# Patient Record
Sex: FEMALE | Race: WHITE | Employment: FULL TIME | URBAN - METROPOLITAN AREA
[De-identification: names, ages, dates, MRNs, and addresses within clinical notes are randomized per-mention and may not be internally consistent; named-entity substitution may affect disease eponyms.]

---

## 2024-02-23 ENCOUNTER — HOSPITAL ENCOUNTER (OUTPATIENT)
Age: 54
Discharge: HOME | End: 2024-02-23
Payer: COMMERCIAL

## 2024-02-23 VITALS
BODY MASS INDEX: 34.15 KG/M2 | HEART RATE: 81 BPM | TEMPERATURE: 98 F | DIASTOLIC BLOOD PRESSURE: 88 MMHG | WEIGHT: 200 LBS | RESPIRATION RATE: 18 BRPM | OXYGEN SATURATION: 97 % | HEIGHT: 64 IN | SYSTOLIC BLOOD PRESSURE: 134 MMHG

## 2024-02-23 DIAGNOSIS — N30.00 ACUTE CYSTITIS WITHOUT HEMATURIA: Primary | ICD-10-CM

## 2024-02-23 LAB
CLARITY UR: CLEAR
GLUCOSE UR STRIP-MCNC: 100 MG/DL
NITRITE UR QL STRIP: POSITIVE
PH UR STRIP: 6.5 [PH]
PROT UR STRIP-MCNC: 100 MG/DL
SP GR UR STRIP: 1.01
UROBILINOGEN UR STRIP-ACNC: 4 MG/DL

## 2024-02-23 PROCEDURE — 99203 OFFICE O/P NEW LOW 30 MIN: CPT

## 2024-02-23 PROCEDURE — 81002 URINALYSIS NONAUTO W/O SCOPE: CPT

## 2024-02-23 PROCEDURE — 99204 OFFICE O/P NEW MOD 45 MIN: CPT

## 2024-02-23 RX ORDER — SULFAMETHOXAZOLE AND TRIMETHOPRIM 800; 160 MG/1; MG/1
1 TABLET ORAL 2 TIMES DAILY
Qty: 14 TABLET | Refills: 0 | Status: SHIPPED | OUTPATIENT
Start: 2024-02-23 | End: 2024-03-01

## 2024-02-23 NOTE — ED INITIAL ASSESSMENT (HPI)
Pt began 2-3 days ago with urinary pain , frequency, and urgency and then had some back pain today

## 2024-02-24 NOTE — ED PROVIDER NOTES
Patient Seen in: [facility]      History     Chief Complaint   Patient presents with    Urinary Symptoms     Stated Complaint: urinary issue, fever    Subjective:   HPI    Dolly is a 53-year-old female presents today for evaluation of dysuria.  She states that a couple of days ago she started to have urinary pain and frequency.  She then developed urgency and back pain today, worse on the right than the left.  She started taking Azo, but has not had much relief.  She denies gross hematuria, fever, chills, nausea and vomiting.    Objective:   Past Medical History:   Diagnosis Date    Amenorrhea     Diverticulitis     Hyperlipidemia     Pap smear for cervical cancer screening 11/05,6/08,11/11,4/30/13    wnl    Thyroid disease               Past Surgical History:   Procedure Laterality Date    ABLATION  2002    for heart accessory pathway    ABLATION      CARDIAC                Social History     Socioeconomic History    Marital status:    Occupational History    Occupation:    Tobacco Use    Smoking status: Never    Smokeless tobacco: Never   Vaping Use    Vaping Use: Never used   Substance and Sexual Activity    Alcohol use: Yes     Comment: occ    Drug use: No              Review of Systems    Positive for stated complaint: urinary issue, fever  Other systems are as noted in HPI.  Constitutional and vital signs reviewed.      All other systems reviewed and negative except as noted above.    Physical Exam     ED Triage Vitals [02/23/24 1537]   /88   Pulse 81   Resp 18   Temp 98.3 °F (36.8 °C)   Temp src Temporal   SpO2 97 %   O2 Device None (Room air)       Current:/88   Pulse 81   Temp 98.3 °F (36.8 °C) (Temporal)   Resp 18   Ht 162.6 cm (5' 4\")   Wt 90.7 kg   SpO2 97%   BMI 34.33 kg/m²         Physical Exam    Nursing note and vitals reviewed.  Constitutional: Oriented to person, place, and time. Patient appears well-developed and well-nourished, non-toxic  and in no acute distress.  Head: Normocephalic and atraumatic.   Cardiovascular: Normal rate, regular rhythm, normal heart sounds and intact distal pulses.    Pulmonary/Chest: Effort normal and breath sounds normal.   Abdominal: Non-distended, no skin changes, BS normal, Non-tender to light and deep palpation in all 4 quadrants. No suprapubic tenderness.  Back: No CVAT  Musculoskeletal: Normal range of motion. No edema or tenderness.   Neurological: CN III - XII grossly intact, normal strength and sensation.   Skin: Skin is warm and dry. No rash noted. No erythema.    ED Course     Labs Reviewed   [facility] POCT URINALYSIS DIPSTICK - Abnormal; Notable for the following components:       Result Value    Urine Color Orange (*)     Protein urine 100 (*)     Glucose, Urine 100 (*)     Ketone, Urine Trace (*)     Bilirubin, Urine Small (*)     Blood, Urine Trace-Intact (*)     Nitrite Urine Positive (*)     Urobilinogen urine 4.0 (*)     Leukocyte esterase urine Large (*)     All other components within normal limits             Patient is informed of her urine dip findings.  Will treat with antibiotics.  She expresses understanding and is agreeable.         [facility]     Medical Decision Making  Patient is a 53-year-old female who presents today for evaluation of 2 days of dysuria.  Differential diagnosis includes, but is not limited to urolithiasis, UTI, pyelonephritis, and other potentially life-threatening processes.  Patient urine dip shows evidence of infection.  She does have some pain on the right flank, so will discharge on antibiotics, other than Macrobid.  Strict return precautions discussed.  Patient expresses understanding and agrees with the plan.    Amount and/or Complexity of Data Reviewed  Labs: ordered. Decision-making details documented in ED Course.    Risk  Prescription drug management.        Disposition and Plan     Clinical Impression:  1. Acute cystitis without hematuria          Disposition:  Discharge  2/23/2024  4:02 pm    Follow-up:  [facility]  [address]  [facility] [id number] [phone]    As needed          Medications Prescribed:  Discharge Medication List as of 2/23/2024  4:04 PM        START taking these medications    Details   sulfamethoxazole-trimethoprim -160 MG Oral Tab per tablet Take 1 tablet by mouth 2 (two) times daily for 7 days., Normal, Disp-14 tablet, R-0